# Patient Record
Sex: FEMALE | Race: WHITE | Employment: PART TIME | URBAN - METROPOLITAN AREA
[De-identification: names, ages, dates, MRNs, and addresses within clinical notes are randomized per-mention and may not be internally consistent; named-entity substitution may affect disease eponyms.]

---

## 2017-01-03 ENCOUNTER — HOSPITAL ENCOUNTER (OUTPATIENT)
Dept: PHYSICAL THERAPY | Age: 82
Discharge: HOME | End: 2017-01-03
Admitting: PHYSICAL MEDICINE & REHABILITATION

## 2017-01-10 ENCOUNTER — HOSPITAL ENCOUNTER (OUTPATIENT)
Dept: PHYSICAL THERAPY | Age: 82
Discharge: HOME | End: 2017-01-10
Admitting: PHYSICAL MEDICINE & REHABILITATION

## 2017-01-12 ENCOUNTER — HOSPITAL ENCOUNTER (OUTPATIENT)
Dept: PHYSICAL THERAPY | Age: 82
Discharge: HOME | End: 2017-01-12
Admitting: PHYSICAL MEDICINE & REHABILITATION

## 2017-01-17 ENCOUNTER — HOSPITAL ENCOUNTER (OUTPATIENT)
Dept: PHYSICAL THERAPY | Age: 82
Discharge: HOME | End: 2017-01-17
Admitting: PHYSICAL MEDICINE & REHABILITATION

## 2017-01-19 ENCOUNTER — HOSPITAL ENCOUNTER (OUTPATIENT)
Dept: PHYSICAL THERAPY | Age: 82
Discharge: HOME | End: 2017-01-19
Admitting: PHYSICAL MEDICINE & REHABILITATION

## 2017-01-24 ENCOUNTER — HOSPITAL ENCOUNTER (OUTPATIENT)
Dept: PHYSICAL THERAPY | Age: 82
Discharge: HOME | End: 2017-01-24
Admitting: PHYSICAL MEDICINE & REHABILITATION

## 2017-02-01 ENCOUNTER — HOSPITAL ENCOUNTER (OUTPATIENT)
Dept: OTHER | Age: 82
Discharge: HOME | End: 2017-02-28
Attending: PHYSICAL MEDICINE & REHABILITATION | Admitting: PHYSICAL MEDICINE & REHABILITATION

## 2017-04-17 ENCOUNTER — TELEPHONE (OUTPATIENT)
Dept: INTERNAL MEDICINE CLINIC | Age: 82
End: 2017-04-17

## 2017-07-25 DIAGNOSIS — I10 ESSENTIAL HYPERTENSION: ICD-10-CM

## 2017-07-25 RX ORDER — LISINOPRIL 5 MG/1
TABLET ORAL
Qty: 90 TABLET | Refills: 2 | OUTPATIENT
Start: 2017-07-25

## 2017-08-31 DIAGNOSIS — R23.2 HOT FLASHES: ICD-10-CM

## 2017-08-31 RX ORDER — CONJUGATED ESTROGENS 0.3 MG/1
TABLET, FILM COATED ORAL
Qty: 30 TABLET | Refills: 10 | Status: SHIPPED | OUTPATIENT
Start: 2017-08-31 | End: 2021-11-23

## 2018-12-14 ENCOUNTER — APPOINTMENT (OUTPATIENT)
Dept: CT IMAGING | Age: 83
End: 2018-12-14
Payer: MEDICARE

## 2018-12-14 ENCOUNTER — HOSPITAL ENCOUNTER (EMERGENCY)
Age: 83
Discharge: HOME | End: 2018-12-14
Attending: EMERGENCY MEDICINE
Payer: MEDICARE

## 2018-12-14 VITALS
HEART RATE: 88 BPM | DIASTOLIC BLOOD PRESSURE: 88 MMHG | SYSTOLIC BLOOD PRESSURE: 161 MMHG | HEIGHT: 62 IN | OXYGEN SATURATION: 98 % | RESPIRATION RATE: 18 BRPM | TEMPERATURE: 98.3 F | WEIGHT: 149.25 LBS | BODY MASS INDEX: 27.47 KG/M2

## 2018-12-14 DIAGNOSIS — S09.90XA INJURY OF HEAD, INITIAL ENCOUNTER: Primary | ICD-10-CM

## 2018-12-14 PROCEDURE — 72125 CT NECK SPINE W/O DYE: CPT

## 2018-12-14 PROCEDURE — 99283 EMERGENCY DEPT VISIT LOW MDM: CPT

## 2018-12-14 PROCEDURE — 6370000000 HC RX 637 (ALT 250 FOR IP): Performed by: EMERGENCY MEDICINE

## 2018-12-14 PROCEDURE — 70450 CT HEAD/BRAIN W/O DYE: CPT

## 2018-12-14 RX ORDER — ACETAMINOPHEN 500 MG
1000 TABLET ORAL ONCE
Status: COMPLETED | OUTPATIENT
Start: 2018-12-14 | End: 2018-12-14

## 2018-12-14 RX ADMIN — ACETAMINOPHEN 1000 MG: 500 TABLET ORAL at 17:26

## 2018-12-14 ASSESSMENT — PAIN SCALES - GENERAL
PAINLEVEL_OUTOF10: 5
PAINLEVEL_OUTOF10: 5

## 2018-12-14 NOTE — ED PROVIDER NOTES
Emergency [facility]    Patient: Shruthi Curry  MRN: [id number]  : 1923  Date of Evaluation: 2018  ED Provider: Naomi Astorga MD    Chief Complaint       Chief Complaint   Patient presents with   [facility] Fall     pt had missed a step  fell and hit lf side of head upper forehead and lf cheek     Melany Mayer is a 80 y.o. female who presents to the emergency department Complaining of accidentally falling when walking down steps at [facility] today. She hit the back of her head and in the front of her head when she went down forward. She denies any loss of consciousness complains mainly of left neck pain and a bruise to the left forehead and left posterior scalp. His. Her pain is in the mid range. She denies any thoracic or lumbar pain syncope abdominal or chest pain motor weakness or paresthesias. ROS:     At least 7 systems reviewed and otherwise acutely negative except as in the [address] Past History     Past Medical History:   Diagnosis Date    Carpal tunnel syndrome     DVT (deep venous thrombosis) ([facility] [address]     Hypertension     Right groin pain     Varicose veins      Past Surgical History:   Procedure Laterality Date    BACK SURGERY  2014     SECTION      2     HYSTERECTOMY      partial    TOTAL KNEE ARTHROPLASTY Bilateral     2     Social History     Social History    Marital status:       Spouse name: N/A    Number of children: N/A    Years of education: N/A     Social History Main Topics    Smoking status: Never Smoker    Smokeless tobacco: Never Used    Alcohol use 0.0 oz/week    Drug use: No    Sexual activity: No     Other Topics Concern    None     Social History Narrative    None       Medications/Allergies     Discharge Medication List as of 2018  5:52 PM      CONTINUE these medications which have NOT CHANGED    Details   PREMARIN 0.3 MG tablet TAKE ONE TABLET BY MOUTH DAILY, Disp-30 tablet,

## 2019-06-26 ENCOUNTER — OFFICE VISIT (OUTPATIENT)
Dept: SURGERY | Age: 84
End: 2019-06-26
Payer: MEDICARE

## 2019-06-26 VITALS
WEIGHT: 148 LBS | SYSTOLIC BLOOD PRESSURE: 136 MMHG | DIASTOLIC BLOOD PRESSURE: 56 MMHG | BODY MASS INDEX: 27.07 KG/M2 | HEART RATE: 47 BPM

## 2019-06-26 DIAGNOSIS — I83.93 ASYMPTOMATIC VARICOSE VEINS OF BOTH LOWER EXTREMITIES: ICD-10-CM

## 2019-06-26 DIAGNOSIS — I10 HYPERTENSION, UNSPECIFIED TYPE: ICD-10-CM

## 2019-06-26 DIAGNOSIS — G57.93 NEUROPATHY OF BOTH FEET: Primary | ICD-10-CM

## 2019-06-26 PROCEDURE — 1123F ACP DISCUSS/DSCN MKR DOCD: CPT | Performed by: NURSE PRACTITIONER

## 2019-06-26 PROCEDURE — 99202 OFFICE O/P NEW SF 15 MIN: CPT | Performed by: NURSE PRACTITIONER

## 2019-06-26 PROCEDURE — 1090F PRES/ABSN URINE INCON ASSESS: CPT | Performed by: NURSE PRACTITIONER

## 2019-06-26 PROCEDURE — 1036F TOBACCO NON-USER: CPT | Performed by: NURSE PRACTITIONER

## 2019-06-26 PROCEDURE — G8427 DOCREV CUR MEDS BY ELIG CLIN: HCPCS | Performed by: NURSE PRACTITIONER

## 2019-06-26 PROCEDURE — 4040F PNEUMOC VAC/ADMIN/RCVD: CPT | Performed by: NURSE PRACTITIONER

## 2019-06-26 PROCEDURE — G8419 CALC BMI OUT NRM PARAM NOF/U: HCPCS | Performed by: NURSE PRACTITIONER

## 2019-06-26 NOTE — PATIENT INSTRUCTIONS
The neuropathy appears to be related to your back problems. Your arteries in your legs appear to be very normal.    You may want to see a neurologist for your balance problems. We have provided a list of the [facility] doctors that  may be of help to you.

## 2019-06-27 PROBLEM — I83.93 VARICOSE VEINS OF BOTH LOWER EXTREMITIES: Status: ACTIVE | Noted: 2019-06-27

## 2019-06-27 NOTE — PROGRESS NOTES
Subjective:      Patient ID: Arash Oshea is a 80 y.o. female. Patient denies pain, but reports pins and needles in her feet most of the time. HPI     Patient reports that she has known neuropathy in her bilateral feet and is here to see if we can offer any solution. Patient reports back surgery 5/9/14 that did not go too well. She still has two pinched nerves affecting her feet; she cannot walk properly since the operation. She reports that her doctor offered to give her steroid injections, but she did not agree. Review of Systems   Neurological:        Sensory problems in feet   All other systems reviewed and are negative. No Known Allergies    Prior to Visit Medications    Medication Sig Taking? Authorizing Provider   Probiotic Product (PROBIOTIC DAILY PO) Take by mouth Yes Historical Provider, MD   Multiple Vitamin (MULTI-VITAMIN DAILY PO) Take by mouth Yes Historical Provider, MD   PREMARIN 0.3 MG tablet TAKE ONE TABLET BY MOUTH DAILY Yes Sabrina Mancera MD   lisinopril (PRINIVIL;ZESTRIL) 5 MG tablet Take 1 tablet by mouth daily Yes Sabrina Mancera MD   aspirin 81 MG tablet Take 1 tablet by mouth daily Yes Sabrina Mancera MD       History reviewed. Objective:   Physical Exam   Constitutional: She is oriented to person, place, and time. No distress. HENT:   Head: Normocephalic. Right Ear: Hearing normal.   Left Ear: Hearing normal.   Eyes: Conjunctivae and lids are normal.   Neck: Trachea normal and normal range of motion. Neck supple. Carotid bruit is not present. No tracheal deviation present. Cardiovascular: Normal rate, regular rhythm and normal heart sounds. Pulses:       Popliteal pulses are 2+ on the right side, and 2+ on the left side. Dorsalis pedis pulses are 2+ on the right side, and 2+ on the left side. Posterior tibial pulses are 1+ on the right side, and 1+ on the left side. Bilateral varicose veins.   Large varicose veins in the medial right thigh.    CHANA'S:  Right:  P.T.: 104 D.P.: 146 ARM BP: 136        P. I.: 1.07/.76  Left:  P.T.: 152 D.P.: 136 ARM BP: 135        P. I.: 1.0/1.18     Pulmonary/Chest: Effort normal and breath sounds normal.   Abdominal: Soft. Normal appearance. She exhibits no mass. There is no tenderness. There is no rigidity, no rebound and no guarding. Musculoskeletal: Normal range of motion. She exhibits no edema. Neurological: She is alert and oriented to person, place, and time. A sensory deficit (neuropathy of bilateral feet following back surgery) is present. Gait (discomfort in feet; balance problem) abnormal.   Skin: Skin is warm, dry and intact. Psychiatric: Judgment normal.       Assessment:       Diagnosis   1. Neuropathy of both feet    2. Asymptomatic varicose veins of both lower extremities    3. Hypertension, unspecified type - stable on lisinopril           Plan:      The neuropathy appears to be related to back problems. Arterial circulation is normal.  Varicose veins do not require any attention at present. Try to be as active as possible.

## 2020-07-28 ENCOUNTER — TELEPHONE (OUTPATIENT)
Dept: UROGYNECOLOGY | Age: 85
End: 2020-07-28

## 2020-07-29 ENCOUNTER — OFFICE VISIT (OUTPATIENT)
Dept: UROGYNECOLOGY | Age: 85
End: 2020-07-29
Payer: MEDICARE

## 2020-07-29 VITALS
RESPIRATION RATE: 16 BRPM | SYSTOLIC BLOOD PRESSURE: 194 MMHG | DIASTOLIC BLOOD PRESSURE: 82 MMHG | HEART RATE: 70 BPM | BODY MASS INDEX: 27.6 KG/M2 | HEIGHT: 62 IN | OXYGEN SATURATION: 96 % | WEIGHT: 150 LBS | TEMPERATURE: 97.3 F

## 2020-07-29 PROCEDURE — 99214 OFFICE O/P EST MOD 30 MIN: CPT | Performed by: OBSTETRICS & GYNECOLOGY

## 2020-07-29 RX ORDER — OXYCODONE HYDROCHLORIDE 5 MG/1
5 CAPSULE ORAL EVERY 4 HOURS PRN
COMMUNITY

## 2020-07-29 NOTE — PROGRESS NOTES
 Transportation needs     Medical: Not on file     Non-medical: Not on file   Tobacco Use    Smoking status: Never Smoker    Smokeless tobacco: Never Used   Substance and Sexual Activity    Alcohol use: Yes     Alcohol/week: 0.0 standard drinks    Drug use: No    Sexual activity: Never   Lifestyle    Physical activity     Days per week: Not on file     Minutes per session: Not on file    Stress: Not on file   Relationships    Social connections     Talks on phone: Not on file     Gets together: Not on file     Attends Yazdanism service: Not on file     Active member of club or organization: Not on file     Attends meetings of clubs or organizations: Not on file     Relationship status: Not on file    Intimate partner violence     Fear of current or ex partner: Not on file     Emotionally abused: Not on file     Physically abused: Not on file     Forced sexual activity: Not on file   Other Topics Concern    Not on file   Social History Narrative    Not on file     Family History: No family history on file. Review of System   Review of Systems    Objective:     Vitals  Vitals:    07/29/20 1330   BP: (!) 194/82   Site: Left Upper Arm   Position: Sitting   Cuff Size: Medium Adult   Pulse: 70   Resp: 16   Temp: 97.3 °F (36.3 °C)   TempSrc: Temporal   SpO2: 96%   Weight: 150 lb (68 kg)   Height: 5' 2\" (1.575 m)     Physical Exam  Physical Exam  HENT:      Head: Normocephalic and atraumatic. Eyes:      Conjunctiva/sclera: Conjunctivae normal.   Neck:      Musculoskeletal: Normal range of motion and neck supple. Pulmonary:      Effort: Pulmonary effort is normal.   Abdominal:      Palpations: Abdomen is soft. Skin:     General: Skin is warm and dry. Neurological:      Mental Status: She is alert and oriented to person, place, and time. No results found for this visit on 07/29/20. Assessment/Plan:     Demario Pink is a 80 y.o. female with   1. Urinary urgency    2. Urinary frequency    3. Urge incontinence    4. Nocturia    5. Hot flashes    She has tried previous anticholinergic medications and Myrbetriq to help control her overactive bladder. Myrbetriq works the best but is far from perfect. She still undergoes several pads with changes every day. She was previously appreciated and consented for Botox injections  And this was canceled because of COVID-19. She would like to move forward with Botox injections. I did order 2 weeks worth of Myrbetriq and hopefully we will be able to give her her Botox in the very near future. No orders of the defined types were placed in this encounter.     Orders Placed This Encounter   Medications    mirabegron (MYRBETRIQ) 25 MG TB24     Sig: Take 1 tablet by mouth daily     Dispense:  15 tablet     Refill:  0       MIGUELANGEL Michel am scribing for and in the presence and direction of Dr. Jyoti Morrow.  7/29/2020   Marino Baumgarten, LPN

## 2020-07-29 NOTE — LETTER
[address]  Urogynecology  [facility] [facility]  Phone: [phone]  Fax: [phone]    Marylou Phoenix, MD        July 29, 2020     Michael Pringle[address]age[id number] [place] [address], [address] [place] [address]    Patient: Subha Bazan   MR Number: [id number]   YOB: 1923   Date of Visit: 7/29/2020       Dear Dr. Satya Tam: Thank you for referring Chino Bob to me for evaluation. Below are the relevant portions of my assessment and plan of care. If you have questions, please do not hesitate to call me. I look forward to following Segundo Valentine along with you.     Sincerely,        Marylou Phoenix, MD

## 2020-08-21 ENCOUNTER — TELEPHONE (OUTPATIENT)
Dept: UROGYNECOLOGY | Age: 85
End: 2020-08-21

## 2020-09-28 ENCOUNTER — TELEPHONE (OUTPATIENT)
Dept: UROGYNECOLOGY | Age: 85
End: 2020-09-28

## 2020-09-28 RX ORDER — IBUPROFEN 600 MG/1
600 TABLET ORAL ONCE
Qty: 1 TABLET | Refills: 0 | Status: SHIPPED | OUTPATIENT
Start: 2020-09-28 | End: 2020-09-28

## 2020-09-28 RX ORDER — NITROFURANTOIN 25; 75 MG/1; MG/1
CAPSULE ORAL
Qty: 14 CAPSULE | Refills: 0 | Status: SHIPPED | OUTPATIENT
Start: 2020-09-28 | End: 2021-08-18 | Stop reason: ALTCHOICE

## 2020-09-30 ENCOUNTER — PROCEDURE VISIT (OUTPATIENT)
Dept: UROGYNECOLOGY | Age: 85
End: 2020-09-30
Payer: MEDICARE

## 2020-09-30 VITALS
DIASTOLIC BLOOD PRESSURE: 82 MMHG | HEART RATE: 59 BPM | RESPIRATION RATE: 16 BRPM | OXYGEN SATURATION: 99 % | SYSTOLIC BLOOD PRESSURE: 170 MMHG | TEMPERATURE: 96.6 F

## 2020-09-30 PROBLEM — T80.92XA TRANSFUSION REACTION: Status: ACTIVE | Noted: 2017-01-12

## 2020-09-30 PROBLEM — R07.9 ACUTE CHEST PAIN: Status: ACTIVE | Noted: 2019-02-22

## 2020-09-30 PROBLEM — G89.29 CHRONIC BILATERAL LOW BACK PAIN WITH SCIATICA: Status: ACTIVE | Noted: 2017-01-09

## 2020-09-30 PROBLEM — R42 VERTIGO: Status: ACTIVE | Noted: 2017-06-30

## 2020-09-30 PROBLEM — M25.562 PAIN IN LEFT KNEE: Status: ACTIVE | Noted: 2018-10-16

## 2020-09-30 PROBLEM — L03.113 CELLULITIS OF RIGHT HAND: Status: ACTIVE | Noted: 2017-01-12

## 2020-09-30 PROBLEM — M51.26 LUMBAR DISC HERNIATION: Status: ACTIVE | Noted: 2018-07-11

## 2020-09-30 PROBLEM — Z96.653 H/O TOTAL KNEE REPLACEMENT, BILATERAL: Status: ACTIVE | Noted: 2017-04-12

## 2020-09-30 PROBLEM — N32.81 OVERACTIVE BLADDER: Status: ACTIVE | Noted: 2017-01-12

## 2020-09-30 PROBLEM — M54.40 CHRONIC BILATERAL LOW BACK PAIN WITH SCIATICA: Status: ACTIVE | Noted: 2017-01-09

## 2020-09-30 PROBLEM — M47.816 SPONDYLOSIS WITHOUT MYELOPATHY OR RADICULOPATHY, LUMBAR REGION: Status: ACTIVE | Noted: 2019-10-28

## 2020-09-30 PROBLEM — R21 RASH AND NONSPECIFIC SKIN ERUPTION: Status: ACTIVE | Noted: 2019-08-19

## 2020-09-30 PROBLEM — R42 EPISODIC LIGHTHEADEDNESS: Status: ACTIVE | Noted: 2017-06-19

## 2020-09-30 PROBLEM — M53.3 SACROILIAC JOINT PAIN: Status: ACTIVE | Noted: 2018-10-16
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
## 2020-09-30 LAB
BILIRUBIN, POC: NORMAL
BLOOD URINE, POC: NORMAL
CLARITY, POC: CLEAR
COLOR, POC: YELLOW
GLUCOSE URINE, POC: NORMAL
KETONES, POC: NORMAL
LEUKOCYTE EST, POC: NORMAL
NITRITE, POC: NORMAL
PH, POC: 6.5
PROTEIN, POC: NORMAL
SPECIFIC GRAVITY, POC: 1.01
UROBILINOGEN, POC: NORMAL

## 2020-09-30 PROCEDURE — 52287 CYSTOSCOPY CHEMODENERVATION: CPT | Performed by: OBSTETRICS & GYNECOLOGY

## 2020-09-30 PROCEDURE — 81002 URINALYSIS NONAUTO W/O SCOPE: CPT | Performed by: OBSTETRICS & GYNECOLOGY

## 2020-09-30 ASSESSMENT — ENCOUNTER SYMPTOMS: BACK PAIN: 1

## 2020-09-30 NOTE — PROGRESS NOTES
2020       HPI:     Name: Alf Briscoe  YOB: 1923    CC: Patient with urinary urgency, frequency, overactive bladder. HPI: Alf Briscoe is a 80 y.o. female who is here for intravesical botulinum toxin A injection. Past Medical History:   Past Medical History:   Diagnosis Date    Ataxia     Carpal tunnel syndrome     [facility]     Episodic lightheadedness     Female stress incontinence     H/O total knee replacement     Hot flashes     Hyperlipidemia     Hypertension     Lumbar spinal stenosis     Neuropathy of both feet     Nocturia     Osteoarthritis     Overactive bladder     Overflow stress incontinence of urine in female     Right groin pain     S/P total knee arthroplasty     Sacroiliac joint pain     Urge incontinence     Urinary frequency     Urinary retention with incomplete bladder emptying     Urinary urgency     Varicose veins      Past Surgical History:   Past Surgical History:   Procedure Laterality Date    ARTHROPLASTY      BACK SURGERY  2014     SECTION      2     HYSTERECTOMY      partial    TOTAL KNEE ARTHROPLASTY Bilateral     2     Current Medications:  Current Outpatient Medications   Medication Sig Dispense Refill    nitrofurantoin, macrocrystal-monohydrate, (MACROBID) 100 MG capsule 1 po BID for Botox Procedure 14 capsule 0    oxyCODONE 5 MG capsule Take 5 mg by mouth every 4 hours as needed for Pain.       mirabegron (MYRBETRIQ) 25 MG TB24 Take 1 tablet by mouth daily 15 tablet 0    Probiotic Product (PROBIOTIC DAILY PO) Take by mouth      Multiple Vitamin (MULTI-VITAMIN DAILY PO) Take by mouth      PREMARIN 0.3 MG tablet TAKE ONE TABLET BY MOUTH DAILY 30 tablet 10    lisinopril (PRINIVIL;ZESTRIL) 5 MG tablet Take 1 tablet by mouth daily 90 tablet 3    aspirin 81 MG tablet Take 1 tablet by mouth daily 100 tablet 3    ibuprofen (ADVIL;MOTRIN) 600 MG tablet Take 1 tablet by mouth once for 1 dose Take 1 pill 1 hour prior to Botox Procedure. 1 tablet 0     Current Facility-Administered Medications   Medication Dose Route Frequency Provider Last Rate Last Dose    onabotulinumtoxin A (BOTOX) injection 100 Units  100 Units Bladder Once Shae Duke MD         Allergies: No Known Allergies  Social History:   Social History     Socioeconomic History    Marital status:      Spouse name: Not on file    Number of children: Not on file    Years of education: Not on file    Highest education level: Not on file   Occupational History    Not on file   Social Needs    Financial resource strain: Not on file    Food insecurity     Worry: Not on file     Inability: Not on file    Transportation needs     Medical: Not on file     Non-medical: Not on file   Tobacco Use    Smoking status: Never Smoker    Smokeless tobacco: Never Used   Substance and Sexual Activity    Alcohol use: Yes     Alcohol/week: 0.0 standard drinks    Drug use: No    Sexual activity: Never   Lifestyle    Physical activity     Days per week: Not on file     Minutes per session: Not on file    Stress: Not on file   Relationships    Social connections     Talks on phone: Not on file     Gets together: Not on file     Attends Adventism service: Not on file     Active member of club or organization: Not on file     Attends meetings of clubs or organizations: Not on file     Relationship status: Not on file    Intimate partner violence     Fear of current or ex partner: Not on file     Emotionally abused: Not on file     Physically abused: Not on file     Forced sexual activity: Not on file   Other Topics Concern    Not on file   Social History Narrative    Not on file     Family History: No family history on file. Review of System  Review of Systems   Genitourinary: Positive for enuresis. Musculoskeletal: Positive for back pain. Hematological: Bruises/bleeds easily.        Objective:     Vital Signs  Vitals: 09/30/20 0943   BP: (!) 170/82   Pulse: 59   Resp: 16   Temp: 96.6 °F (35.9 °C)   SpO2: 99%      Physical Exam  Physical Exam  Exam conducted with a chaperone present. HENT:      Head: Normocephalic and atraumatic. Pulmonary:      Effort: Pulmonary effort is normal.   Abdominal:      General: Abdomen is flat. Palpations: Abdomen is soft. Genitourinary:     Exam position: Lithotomy position. Skin:     General: Skin is warm and dry. Neurological:      Mental Status: She is alert and oriented to person, place, and time. Psychiatric:         Mood and Affect: Mood normal.       Procedure:  Patient was taken to the cystoscopy suite and allowed to void. Following this, the urethra was cleaned and then dilated to 14 Latvian using lidocaine gel. A Cain catheter was placed using lidocaine gel. The remaining urine was drained and a dipstick urinalysis was performed with the following results: WBC negative, nitrates negative, RBC negative. The bladder was then instilled with 60ml of 2% lidocaine. The patient remained in the supine position for 20 minutes followed by alternating to the right and left side for 10 additional minutes each. Cystoscope was placed. The bladder wall and trigone were normal in appearance. Botox was injected through the bladder wall taking care to avoid the detrusor. Total units: 75 Total number of injections: 15    Assessment/Plan:     Demario Pink is a 80 y.o. female with urinary urgency, frequency and overactive bladder. Patient tolerated the procedure well. Patient counseled on risk of UTI and urinary retention. She was educated on signs and symptoms of both. She is to call the office with any concerns. The patient will follow up in 2 weeks for a post void residual.  She is not going to have the Myrbetriq refilled after she takes her last 2 tablets.     Orders Placed This Encounter   Procedures    POCT Urinalysis no Micro     Orders Placed This Encounter   Medications    onabotulinumtoxin A (BOTOX) injection 100 Units       Alfreida Mix

## 2020-10-15 ENCOUNTER — OFFICE VISIT (OUTPATIENT)
Dept: UROGYNECOLOGY | Age: 85
End: 2020-10-15
Payer: MEDICARE

## 2020-10-15 VITALS
DIASTOLIC BLOOD PRESSURE: 83 MMHG | RESPIRATION RATE: 16 BRPM | HEART RATE: 68 BPM | SYSTOLIC BLOOD PRESSURE: 197 MMHG | TEMPERATURE: 98 F | OXYGEN SATURATION: 98 %

## 2020-10-15 PROCEDURE — G8417 CALC BMI ABV UP PARAM F/U: HCPCS | Performed by: OBSTETRICS & GYNECOLOGY

## 2020-10-15 PROCEDURE — 4040F PNEUMOC VAC/ADMIN/RCVD: CPT | Performed by: OBSTETRICS & GYNECOLOGY

## 2020-10-15 PROCEDURE — 1090F PRES/ABSN URINE INCON ASSESS: CPT | Performed by: OBSTETRICS & GYNECOLOGY

## 2020-10-15 PROCEDURE — G8428 CUR MEDS NOT DOCUMENT: HCPCS | Performed by: OBSTETRICS & GYNECOLOGY

## 2020-10-15 PROCEDURE — 1123F ACP DISCUSS/DSCN MKR DOCD: CPT | Performed by: OBSTETRICS & GYNECOLOGY

## 2020-10-15 PROCEDURE — 81002 URINALYSIS NONAUTO W/O SCOPE: CPT | Performed by: OBSTETRICS & GYNECOLOGY

## 2020-10-15 PROCEDURE — 1036F TOBACCO NON-USER: CPT | Performed by: OBSTETRICS & GYNECOLOGY

## 2020-10-15 PROCEDURE — 0509F URINE INCON PLAN DOCD: CPT | Performed by: OBSTETRICS & GYNECOLOGY

## 2020-10-15 PROCEDURE — 51701 INSERT BLADDER CATHETER: CPT | Performed by: OBSTETRICS & GYNECOLOGY

## 2020-10-15 PROCEDURE — G8484 FLU IMMUNIZE NO ADMIN: HCPCS | Performed by: OBSTETRICS & GYNECOLOGY

## 2020-10-15 PROCEDURE — 99213 OFFICE O/P EST LOW 20 MIN: CPT | Performed by: OBSTETRICS & GYNECOLOGY

## 2020-10-15 ASSESSMENT — ENCOUNTER SYMPTOMS
BACK PAIN: 1
CONSTIPATION: 1

## 2020-10-15 NOTE — PROGRESS NOTES
10/15/2020       HPI:     Name: Mert Olmstead  YOB: 1923    CC: Patient is a 80 y.o. presenting for evaluation of post residual void. HPI: How long have you had this problem? years  Please rate the severity of your problem: severe  Anything make it better? nothing    Ob/Gyn History:    OB History   No obstetric history on file. Past Medical History:   Past Medical History:   Diagnosis Date    Ataxia     Carpal tunnel syndrome     [facility]     Episodic lightheadedness     Female stress incontinence     H/O total knee replacement     Hot flashes     Hyperlipidemia     Hypertension     Lumbar spinal stenosis     Neuropathy of both feet     Nocturia     Osteoarthritis     Overactive bladder     Overflow stress incontinence of urine in female     Right groin pain     S/P total knee arthroplasty     Sacroiliac joint pain     Urge incontinence     Urinary frequency     Urinary retention with incomplete bladder emptying     Urinary urgency     Varicose veins      Past Surgical History:   Past Surgical History:   Procedure Laterality Date    ARTHROPLASTY      BACK SURGERY  2014     SECTION      2     HYSTERECTOMY      partial    TOTAL KNEE ARTHROPLASTY Bilateral     2     Allergies: No Known Allergies  Current Medications:  Current Outpatient Medications   Medication Sig Dispense Refill    mirabegron (MYRBETRIQ) 25 MG TB24 Take 1 tablet by mouth daily 30 tablet 2    nitrofurantoin, macrocrystal-monohydrate, (MACROBID) 100 MG capsule 1 po BID for Botox Procedure 14 capsule 0    ibuprofen (ADVIL;MOTRIN) 600 MG tablet Take 1 tablet by mouth once for 1 dose Take 1 pill 1 hour prior to Botox Procedure. 1 tablet 0    oxyCODONE 5 MG capsule Take 5 mg by mouth every 4 hours as needed for Pain.       Probiotic Product (PROBIOTIC DAILY PO) Take by mouth      Multiple Vitamin (MULTI-VITAMIN DAILY PO) Take by mouth      PREMARIN 0.3 MG tablet TAKE ONE TABLET BY MOUTH DAILY 30 tablet 10    lisinopril (PRINIVIL;ZESTRIL) 5 MG tablet Take 1 tablet by mouth daily 90 tablet 3    aspirin 81 MG tablet Take 1 tablet by mouth daily 100 tablet 3     No current facility-administered medications for this visit. Social History:   Social History     Socioeconomic History    Marital status:      Spouse name: Not on file    Number of children: Not on file    Years of education: Not on file    Highest education level: Not on file   Occupational History    Not on file   Social Needs    Financial resource strain: Not on file    Food insecurity     Worry: Not on file     Inability: Not on file    Transportation needs     Medical: Not on file     Non-medical: Not on file   Tobacco Use    Smoking status: Never Smoker    Smokeless tobacco: Never Used   Substance and Sexual Activity    Alcohol use: Yes     Alcohol/week: 0.0 standard drinks    Drug use: No    Sexual activity: Never   Lifestyle    Physical activity     Days per week: Not on file     Minutes per session: Not on file    Stress: Not on file   Relationships    Social connections     Talks on phone: Not on file     Gets together: Not on file     Attends Scientologist service: Not on file     Active member of club or organization: Not on file     Attends meetings of clubs or organizations: Not on file     Relationship status: Not on file    Intimate partner violence     Fear of current or ex partner: Not on file     Emotionally abused: Not on file     Physically abused: Not on file     Forced sexual activity: Not on file   Other Topics Concern    Not on file   Social History Narrative    Not on file     Family History: No family history on file. Review of System   Review of Systems   HENT: Positive for tinnitus. Gastrointestinal: Positive for constipation. Endocrine: Positive for polyuria. Musculoskeletal: Positive for back pain. Hematological: Bruises/bleeds easily. Objective:     Vitals  Vitals:    10/15/20 1038   BP: (!) 197/83   Pulse: 68   Resp: 16   Temp: 98 °F (36.7 °C)   SpO2: 98%     Physical Exam  Physical Exam  Exam conducted with a chaperone present. HENT:      Head: Normocephalic and atraumatic. Pulmonary:      Effort: Pulmonary effort is normal.   Abdominal:      General: Abdomen is flat. Palpations: Abdomen is soft. Genitourinary:     Exam position: Lithotomy position. Skin:     General: Skin is warm and dry. Neurological:      Mental Status: She is alert and oriented to person, place, and time. Psychiatric:         Mood and Affect: Mood normal.       Straight urinary catheterization preformed by sterile procedure for urine specimen per Dr Henry Labs. 60ml post void. Pt tolerated well. Dr Henry Labs made aware. Results for POC orders placed in visit on 10/15/20   POCT Urinalysis no Micro   Result Value Ref Range    Color, UA yellow     Clarity, UA clear     Glucose, UA POC neg     Bilirubin, UA neg     Ketones, UA neg     Spec Grav, UA 1.015     Blood, UA POC neg     pH, UA 6.5     Protein, UA POC neg     Urobilinogen, UA neg     Leukocytes, UA neg     Nitrite, UA neg        Assessment/Plan:     Rachel Travis is a 80 y.o. female with   1. Urinary urgency    2. Urinary frequency    3. Nocturia    4. Urge incontinence      Unfortunately she states she noticed no improvement with the Botox injections into the bladder. This is disappointing since she really struggled on the medication and the medication is very expensive. I did reduce the dose slightly because of her age and I am afraid to give her an additional injection with more medication for fear of urinary retention. She is emptying her bladder well today and there is no sign of infection. She has agreed to go back on the Myrbetriq and we will try and get a tier reduction for her. She will follow up with me in approximately 3 months time.     Orders Placed This Encounter Procedures    POCT Urinalysis no Micro    AR INSERT,TEMP INDWELLING BLAD CATH,SIMPLE     Orders Placed This Encounter   Medications    mirabegron (MYRBETRIQ) 25 MG TB24     Sig: Take 1 tablet by mouth daily     Dispense:  30 tablet     Refill:  2       Henreitta Phlegm

## 2020-10-15 NOTE — PROGRESS NOTES
Straight urinary catheterization preformed by sterile procedure for urine specimen and post residual void per Dr Claude Blamer. 60ml post void. Pt tolerated well. Dr Claude Blamer made aware.

## 2021-01-14 ENCOUNTER — OFFICE VISIT (OUTPATIENT)
Dept: UROGYNECOLOGY | Age: 86
End: 2021-01-14
Payer: MEDICARE

## 2021-01-14 VITALS
SYSTOLIC BLOOD PRESSURE: 142 MMHG | HEART RATE: 84 BPM | OXYGEN SATURATION: 97 % | TEMPERATURE: 97.1 F | RESPIRATION RATE: 18 BRPM | DIASTOLIC BLOOD PRESSURE: 76 MMHG

## 2021-01-14 DIAGNOSIS — R35.0 URINARY FREQUENCY: ICD-10-CM

## 2021-01-14 DIAGNOSIS — N39.41 URGE INCONTINENCE: ICD-10-CM

## 2021-01-14 DIAGNOSIS — R35.1 NOCTURIA: ICD-10-CM

## 2021-01-14 DIAGNOSIS — R39.15 URINARY URGENCY: Primary | ICD-10-CM

## 2021-01-14 PROCEDURE — 4040F PNEUMOC VAC/ADMIN/RCVD: CPT | Performed by: OBSTETRICS & GYNECOLOGY

## 2021-01-14 PROCEDURE — 1123F ACP DISCUSS/DSCN MKR DOCD: CPT | Performed by: OBSTETRICS & GYNECOLOGY

## 2021-01-14 PROCEDURE — 99214 OFFICE O/P EST MOD 30 MIN: CPT | Performed by: OBSTETRICS & GYNECOLOGY

## 2021-01-14 PROCEDURE — 1090F PRES/ABSN URINE INCON ASSESS: CPT | Performed by: OBSTETRICS & GYNECOLOGY

## 2021-01-14 PROCEDURE — 0509F URINE INCON PLAN DOCD: CPT | Performed by: OBSTETRICS & GYNECOLOGY

## 2021-01-14 PROCEDURE — G8427 DOCREV CUR MEDS BY ELIG CLIN: HCPCS | Performed by: OBSTETRICS & GYNECOLOGY

## 2021-01-14 PROCEDURE — 1036F TOBACCO NON-USER: CPT | Performed by: OBSTETRICS & GYNECOLOGY

## 2021-01-14 PROCEDURE — G8484 FLU IMMUNIZE NO ADMIN: HCPCS | Performed by: OBSTETRICS & GYNECOLOGY

## 2021-01-14 PROCEDURE — G8417 CALC BMI ABV UP PARAM F/U: HCPCS | Performed by: OBSTETRICS & GYNECOLOGY

## 2021-01-14 ASSESSMENT — ENCOUNTER SYMPTOMS
CONSTIPATION: 1
BACK PAIN: 1

## 2021-01-14 NOTE — PROGRESS NOTES
2021       HPI:     Name: Anabell Conklin  YOB: 1923    CC: Patient is a 80 y.o. presenting for evaluation of voiding dysfunction, uregency. HPI: How long have you had this problem?  forever  Please rate the severity of your problem: severe  Anything make it better? Nothing making it better. Ob/Gyn History:    OB History   No obstetric history on file. Past Medical History:   Past Medical History:   Diagnosis Date    Ataxia     Carpal tunnel syndrome     [facility]) ([facility])     Episodic lightheadedness     Female stress incontinence     H/O total knee replacement     Hot flashes     Hyperlipidemia     Hypertension     Lumbar spinal stenosis     Neuropathy of both feet     Nocturia     Osteoarthritis     Overactive bladder     Overflow stress incontinence of urine in female     Right groin pain     S/P total knee arthroplasty     Sacroiliac joint pain     Urge incontinence     Urinary frequency     Urinary retention with incomplete bladder emptying     Urinary urgency     Varicose veins      Past Surgical History:   Past Surgical History:   Procedure Laterality Date    ARTHROPLASTY      BACK SURGERY  2014     SECTION      2     HYSTERECTOMY      partial    TOTAL KNEE ARTHROPLASTY Bilateral     2     Allergies: No Known Allergies  Current Medications:  Current Outpatient Medications   Medication Sig Dispense Refill    mirabegron (MYRBETRIQ) 25 MG TB24 Take 1 tablet by mouth daily 30 tablet 3    nitrofurantoin, macrocrystal-monohydrate, (MACROBID) 100 MG capsule 1 po BID for Botox Procedure 14 capsule 0    oxyCODONE 5 MG capsule Take 5 mg by mouth every 4 hours as needed for Pain.       Probiotic Product (PROBIOTIC DAILY PO) Take by mouth      Multiple Vitamin (MULTI-VITAMIN DAILY PO) Take by mouth      PREMARIN 0.3 MG tablet TAKE ONE TABLET BY MOUTH DAILY 30 tablet 10  lisinopril (PRINIVIL;ZESTRIL) 5 MG tablet Take 1 tablet by mouth daily 90 tablet 3    aspirin 81 MG tablet Take 1 tablet by mouth daily 100 tablet 3    ibuprofen (ADVIL;MOTRIN) 600 MG tablet Take 1 tablet by mouth once for 1 dose Take 1 pill 1 hour prior to Botox Procedure. 1 tablet 0     No current facility-administered medications for this visit. Social History:   Social History     Socioeconomic History    Marital status:      Spouse name: Not on file    Number of children: Not on file    Years of education: Not on file    Highest education level: Not on file   Occupational History    Not on file   Social Needs    Financial resource strain: Not on file    Food insecurity     Worry: Not on file     Inability: Not on file    Transportation needs     Medical: Not on file     Non-medical: Not on file   Tobacco Use    Smoking status: Never Smoker    Smokeless tobacco: Never Used   Substance and Sexual Activity    Alcohol use: Yes     Alcohol/week: 0.0 standard drinks    Drug use: No    Sexual activity: Never   Lifestyle    Physical activity     Days per week: Not on file     Minutes per session: Not on file    Stress: Not on file   Relationships    Social connections     Talks on phone: Not on file     Gets together: Not on file     Attends Yazdanism service: Not on file     Active member of club or organization: Not on file     Attends meetings of clubs or organizations: Not on file     Relationship status: Not on file    Intimate partner violence     Fear of current or ex partner: Not on file     Emotionally abused: Not on file     Physically abused: Not on file     Forced sexual activity: Not on file   Other Topics Concern    Not on file   Social History Narrative    Not on file     Family History: History reviewed. No pertinent family history. Review of System   Review of Systems   HENT: Positive for tinnitus. Gastrointestinal: Positive for constipation. Endocrine: Positive for polyuria. Genitourinary: Positive for enuresis. Musculoskeletal: Positive for back pain. All other systems reviewed and are negative. A review of systems was done by the patient and reviewed by me and scanned into media today. Objective:     Vitals  Vitals:    01/14/21 1337   BP: (!) 142/76   Pulse: 84   Resp: 18   Temp: 97.1 °F (36.2 °C)   SpO2: 97%     Physical Exam  Physical Exam  HENT:      Head: Normocephalic and atraumatic. Eyes:      Conjunctiva/sclera: Conjunctivae normal.   Neck:      Musculoskeletal: Normal range of motion and neck supple. Pulmonary:      Effort: Pulmonary effort is normal.   Abdominal:      Palpations: Abdomen is soft. Skin:     General: Skin is warm and dry. Neurological:      Mental Status: She is alert and oriented to person, place, and time. No results found for this visit on 01/14/21. Assessment/Plan:     Aubrie Silva is a 80 y.o. female with   1. Urinary urgency    2. Urinary frequency    3. Nocturia    4. Urge incontinence      She has tried multiple medication for OAB and only myrbetriq has helped. She has had trouble paying for it and we tried a tier reduction but she states it is still expensive. She also has tried botox which did not work well. She will try myrbetriq again and fu with me in 4 months. No orders of the defined types were placed in this encounter.     Orders Placed This Encounter   Medications    mirabegron (MYRBETRIQ) 25 MG TB24     Sig: Take 1 tablet by mouth daily     Dispense:  30 tablet     Refill:  3       Lala Baca

## 2021-01-19 ENCOUNTER — TELEPHONE (OUTPATIENT)
Dept: UROGYNECOLOGY | Age: 86
End: 2021-01-19

## 2021-01-19 NOTE — TELEPHONE ENCOUNTER
Pt states Mybetriq has increased to $192 a month and can not afford it. Can you prescribe something else?

## 2021-08-18 ENCOUNTER — OFFICE VISIT (OUTPATIENT)
Dept: UROGYNECOLOGY | Age: 86
End: 2021-08-18
Payer: MEDICARE

## 2021-08-18 VITALS
RESPIRATION RATE: 16 BRPM | TEMPERATURE: 97.5 F | OXYGEN SATURATION: 100 % | HEART RATE: 80 BPM | DIASTOLIC BLOOD PRESSURE: 70 MMHG | SYSTOLIC BLOOD PRESSURE: 140 MMHG

## 2021-08-18 DIAGNOSIS — R39.15 URINARY URGENCY: Primary | ICD-10-CM

## 2021-08-18 DIAGNOSIS — N39.41 URGE INCONTINENCE: ICD-10-CM

## 2021-08-18 DIAGNOSIS — R35.1 NOCTURIA: ICD-10-CM

## 2021-08-18 DIAGNOSIS — R35.0 URINARY FREQUENCY: ICD-10-CM

## 2021-08-18 PROCEDURE — 99214 OFFICE O/P EST MOD 30 MIN: CPT | Performed by: OBSTETRICS & GYNECOLOGY

## 2021-08-18 PROCEDURE — G8427 DOCREV CUR MEDS BY ELIG CLIN: HCPCS | Performed by: OBSTETRICS & GYNECOLOGY

## 2021-08-18 PROCEDURE — 1036F TOBACCO NON-USER: CPT | Performed by: OBSTETRICS & GYNECOLOGY

## 2021-08-18 PROCEDURE — 4040F PNEUMOC VAC/ADMIN/RCVD: CPT | Performed by: OBSTETRICS & GYNECOLOGY

## 2021-08-18 PROCEDURE — 1123F ACP DISCUSS/DSCN MKR DOCD: CPT | Performed by: OBSTETRICS & GYNECOLOGY

## 2021-08-18 PROCEDURE — G8421 BMI NOT CALCULATED: HCPCS | Performed by: OBSTETRICS & GYNECOLOGY

## 2021-08-18 PROCEDURE — 0509F URINE INCON PLAN DOCD: CPT | Performed by: OBSTETRICS & GYNECOLOGY

## 2021-08-18 PROCEDURE — 1090F PRES/ABSN URINE INCON ASSESS: CPT | Performed by: OBSTETRICS & GYNECOLOGY

## 2021-08-18 ASSESSMENT — ENCOUNTER SYMPTOMS
CONSTIPATION: 1
BACK PAIN: 1

## 2021-08-18 NOTE — PROGRESS NOTES
1 tablet by mouth once for 1 dose Take 1 pill 1 hour prior to Botox Procedure. 1 tablet 0     No current facility-administered medications for this visit. Social History:   Social History     Socioeconomic History    Marital status:      Spouse name: Not on file    Number of children: Not on file    Years of education: Not on file    Highest education level: Not on file   Occupational History    Not on file   Tobacco Use    Smoking status: Never Smoker    Smokeless tobacco: Never Used   Substance and Sexual Activity    Alcohol use: Yes     Alcohol/week: 0.0 standard drinks    Drug use: No    Sexual activity: Never   Other Topics Concern    Not on file   Social History Narrative    Not on file     Social Determinants of Health     Financial Resource Strain:     Difficulty of Paying Living Expenses:    Food Insecurity:     Worried About Running Out of Food in the Last Year:     [address] in the Last Year:    Transportation Needs:     Lack of Transportation (Medical):  Lack of Transportation (Non-Medical):    Physical Activity:     Days of Exercise per Week:     Minutes of Exercise per Session:    Stress:     Feeling of Stress :    Social Connections:     Frequency of Communication with Friends and Family:     Frequency of Social Gatherings with Friends and Family:     Attends Jewish Services:     Active Member of Clubs or Organizations:     Attends Club or Organization Meetings:     Marital Status:    Intimate Partner Violence:     Fear of Current or Ex-Partner:     Emotionally Abused:     Physically Abused:     Sexually Abused:      Family History: History reviewed. No pertinent family history. Review of System   Review of Systems   HENT: Positive for tinnitus. Gastrointestinal: Positive for constipation. Musculoskeletal: Positive for back pain. Hematological: Bruises/bleeds easily. All other systems reviewed and are negative.       A review of systems was done by the patient and reviewed by me and scanned into media today. Objective:     Vitals  Vitals:    08/18/21 0850   BP: (!) 140/70   Pulse: 80   Resp: 16   Temp: 97.5 °F (36.4 °C)   SpO2: 100%     Physical Exam  Physical Exam  HENT:      Head: Normocephalic and atraumatic. Eyes:      Conjunctiva/sclera: Conjunctivae normal.   Pulmonary:      Effort: Pulmonary effort is normal.   Abdominal:      Palpations: Abdomen is soft. Musculoskeletal:      Cervical back: Normal range of motion and neck supple. Skin:     General: Skin is warm and dry. Neurological:      Mental Status: She is alert and oriented to person, place, and time. No results found for this visit on 08/18/21. Assessment/Plan:     Asael  is a 80 y.o. female with   1. Urinary urgency    2. Urinary frequency    3. Nocturia    4. Urge incontinence      I had a long discussion with Val Bravo today about her bladder issues. She has tried multiple anticholinergic medications without success. Myrbetriq does help to some degree. She is currently undergoing chemotherapy treatments for ovarian cancer. I talked with her briefly about potentially trying a urethral bulking agent. This may give her some temporary relief in order to make it to the restroom. However given that she is being treated for cancer I do not think that it would be prudent to try anything until she has a definitive plan moving forward. Although she is getting chemotherapy she stated that she is not wanting to have surgery. I did give her a handout on urethral bulking. No orders of the defined types were placed in this encounter. No orders of the defined types were placed in this encounter.       Radha Landa MD

## 2021-11-23 ENCOUNTER — OFFICE VISIT (OUTPATIENT)
Dept: UROGYNECOLOGY | Age: 86
End: 2021-11-23
Payer: MEDICARE

## 2021-11-23 VITALS
SYSTOLIC BLOOD PRESSURE: 199 MMHG | DIASTOLIC BLOOD PRESSURE: 85 MMHG | OXYGEN SATURATION: 99 % | HEART RATE: 53 BPM | TEMPERATURE: 98 F | RESPIRATION RATE: 14 BRPM

## 2021-11-23 DIAGNOSIS — R39.15 URINARY URGENCY: Primary | ICD-10-CM

## 2021-11-23 DIAGNOSIS — G89.29 CHRONIC BILATERAL LOW BACK PAIN WITH SCIATICA, SCIATICA LATERALITY UNSPECIFIED: ICD-10-CM

## 2021-11-23 DIAGNOSIS — M54.40 CHRONIC BILATERAL LOW BACK PAIN WITH SCIATICA, SCIATICA LATERALITY UNSPECIFIED: ICD-10-CM

## 2021-11-23 DIAGNOSIS — R35.0 URINARY FREQUENCY: ICD-10-CM

## 2021-11-23 DIAGNOSIS — C56.9 MALIGNANT NEOPLASM OF OVARY, UNSPECIFIED LATERALITY (HCC): ICD-10-CM

## 2021-11-23 DIAGNOSIS — N39.41 URGE INCONTINENCE: ICD-10-CM

## 2021-11-23 DIAGNOSIS — R35.1 NOCTURIA: ICD-10-CM

## 2021-11-23 PROCEDURE — 1036F TOBACCO NON-USER: CPT | Performed by: OBSTETRICS & GYNECOLOGY

## 2021-11-23 PROCEDURE — 1090F PRES/ABSN URINE INCON ASSESS: CPT | Performed by: OBSTETRICS & GYNECOLOGY

## 2021-11-23 PROCEDURE — 1123F ACP DISCUSS/DSCN MKR DOCD: CPT | Performed by: OBSTETRICS & GYNECOLOGY

## 2021-11-23 PROCEDURE — 4040F PNEUMOC VAC/ADMIN/RCVD: CPT | Performed by: OBSTETRICS & GYNECOLOGY

## 2021-11-23 PROCEDURE — G8484 FLU IMMUNIZE NO ADMIN: HCPCS | Performed by: OBSTETRICS & GYNECOLOGY

## 2021-11-23 PROCEDURE — G8427 DOCREV CUR MEDS BY ELIG CLIN: HCPCS | Performed by: OBSTETRICS & GYNECOLOGY

## 2021-11-23 PROCEDURE — 99214 OFFICE O/P EST MOD 30 MIN: CPT | Performed by: OBSTETRICS & GYNECOLOGY

## 2021-11-23 PROCEDURE — 0509F URINE INCON PLAN DOCD: CPT | Performed by: OBSTETRICS & GYNECOLOGY

## 2021-11-23 PROCEDURE — G8421 BMI NOT CALCULATED: HCPCS | Performed by: OBSTETRICS & GYNECOLOGY

## 2021-11-23 ASSESSMENT — ENCOUNTER SYMPTOMS: TROUBLE SWALLOWING: 1

## 2021-11-23 NOTE — PROGRESS NOTES
2021       HPI:     Name: Cara Phan  YOB: 1923    CC: Patient is a 80 y.o. presenting for evaluation of urge incontinence . HPI: How long have you had this problem? Please rate the severity of your problem: moderately severe  Anything make it better? Bladder is worse than before, she has no control what so ever. Tried myrbetriq, however it was too expensive     Ob/Gyn History:    OB History   No obstetric history on file. Past Medical History:   Past Medical History:   Diagnosis Date    Ataxia     Carpal tunnel syndrome     [facility]     Episodic lightheadedness     Female stress incontinence     H/O total knee replacement     Hot flashes     Hyperlipidemia     Hypertension     Lumbar spinal stenosis     Neuropathy of both feet     Nocturia     Osteoarthritis     Overactive bladder     Overflow stress incontinence of urine in female     Right groin pain     S/P total knee arthroplasty     Sacroiliac joint pain     Urge incontinence     Urinary frequency     Urinary retention with incomplete bladder emptying     Urinary urgency     Varicose veins      Past Surgical History:   Past Surgical History:   Procedure Laterality Date    ARTHROPLASTY      BACK SURGERY  2014     SECTION      2     HYSTERECTOMY      partial    TOTAL KNEE ARTHROPLASTY Bilateral     2     Allergies:    Allergies   Allergen Reactions    Ondansetron Hcl Other (See Comments)     SEVERE constipation     Current Medications:  Current Outpatient Medications   Medication Sig Dispense Refill    mirabegron (MYRBETRIQ) 25 MG TB24 Take 1 tablet by mouth daily 30 tablet 2    Probiotic Product (PROBIOTIC DAILY PO) Take by mouth      Multiple Vitamin (MULTI-VITAMIN DAILY PO) Take by mouth      aspirin 81 MG tablet Take 1 tablet by mouth daily 100 tablet 3    ibuprofen (ADVIL;MOTRIN) 600 MG tablet Take 1 tablet by mouth once for 1 dose Take 1 pill 1 hour prior to Botox Procedure. 1 tablet 0    oxyCODONE 5 MG capsule Take 5 mg by mouth every 4 hours as needed for Pain. (Patient not taking: Reported on 11/23/2021)      lisinopril (PRINIVIL;ZESTRIL) 5 MG tablet Take 1 tablet by mouth daily (Patient not taking: Reported on 11/23/2021) 90 tablet 3     No current facility-administered medications for this visit. Social History:   Social History     Socioeconomic History    Marital status:      Spouse name: Not on file    Number of children: Not on file    Years of education: Not on file    Highest education level: Not on file   Occupational History    Not on file   Tobacco Use    Smoking status: Never Smoker    Smokeless tobacco: Never Used   Substance and Sexual Activity    Alcohol use: Yes     Alcohol/week: 0.0 standard drinks    Drug use: No    Sexual activity: Never   Other Topics Concern    Not on file   Social History Narrative    Not on file     Social Determinants of Health     Financial Resource Strain:     Difficulty of Paying Living Expenses: Not on file   Food Insecurity:     Worried About Running Out of Food in the Last Year: Not on file    Nadiya of Food in the Last Year: Not on file   Transportation Needs:     Lack of Transportation (Medical): Not on file    Lack of Transportation (Non-Medical):  Not on file   Physical Activity:     Days of Exercise per Week: Not on file    Minutes of Exercise per Session: Not on file   Stress:     Feeling of Stress : Not on file   Social Connections:     Frequency of Communication with Friends and Family: Not on file    Frequency of Social Gatherings with Friends and Family: Not on file    Attends Pentecostal Services: Not on file    Active Member of Clubs or Organizations: Not on file    Attends Club or Organization Meetings: Not on file    Marital Status: Not on file   Intimate Partner Violence:     Fear of Current or Ex-Partner: Not on file    Emotionally Abused: Not on file  Physically Abused: Not on file    Sexually Abused: Not on file   Housing Stability:     Unable to Pay for Housing in the Last Year: Not on file    Number of Places Lived in the Last Year: Not on file    Unstable Housing in the Last Year: Not on file     Family History: History reviewed. No pertinent family history. Review of System   Review of Systems   HENT: Positive for hearing loss and trouble swallowing. Genitourinary: Positive for enuresis and frequency. All other systems reviewed and are negative. A review of systems was done by the patient and reviewed by me. Objective:     Vitals  Vitals:    11/23/21 1254   BP: (!) 199/85   Pulse: 53   Resp: 14   Temp: 98 °F (36.7 °C)   SpO2: 99%     Physical Exam  Physical Exam  HENT:      Head: Normocephalic and atraumatic. Eyes:      Conjunctiva/sclera: Conjunctivae normal.   Pulmonary:      Effort: Pulmonary effort is normal.   Abdominal:      Palpations: Abdomen is soft. Musculoskeletal:      Cervical back: Normal range of motion and neck supple. Skin:     General: Skin is warm and dry. Neurological:      Mental Status: She is alert and oriented to person, place, and time. No results found for this visit on 11/23/21. Assessment/Plan:     Karissa Pompa is a 80 y.o. female with   1. Urinary urgency    2. Urinary frequency    3. Nocturia    4. Urge incontinence    5. Chronic bilateral low back pain with sciatica, sciatica laterality unspecified    6. Malignant neoplasm of ovary, unspecified laterality [facility])    Old records reviewed, outside records were reviewed    Jesus Valle has a history of significant urinary leakage. We have tried multiple overactive bladder medications and only Myrbetriq seems to help however it is not covered well. She is also tried Botox in the past. At her last visit we discussed potentially trying a bulking agent to give her more time to get to the bathroom.  Today she presents to discuss this option in more
